# Patient Record
(demographics unavailable — no encounter records)

---

## 2024-10-18 NOTE — HISTORY OF PRESENT ILLNESS
[FreeTextEntry1] : Patient returns today c/o left acoustic neuroma, snoring, sleep disturbance, nasal septal perforation, nasal obstruction, and neck swelling. States that he had an US done and would like to discuss results.  His symptoms are unchanged since his last visit. No new complaints.

## 2024-10-18 NOTE — REASON FOR VISIT
[Subsequent Evaluation] : a subsequent evaluation for [FreeTextEntry2] : left acoustic neuroma, snoring, sleep disturbance, nasal septal perforation, nasal obstruction.

## 2024-10-18 NOTE — PHYSICAL EXAM
[Normal] : mucosa is normal [Midline] : trachea located in midline position [de-identified] : left neck swelling

## 2024-10-18 NOTE — ASSESSMENT
[FreeTextEntry1] : I personally reviewed, interpreted and discussed patient's MRI images. Stable 4mm acoustic neuroma. Will repeat MRI in 1year.   I personally reviewed, interpreted and discussed patient's US images. 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  repeat in 4M.   Continue CPAP use.

## 2024-10-18 NOTE — DATA REVIEWED
[de-identified] : ACC: [id number]     EXAM:  US HEAD NECK SOFT TISSUE   ORDERED BY: KANWAL TOBIN  PROCEDURE DATE:  08/22/2024    INTERPRETATION:  Indication: Left upper neck swelling.  TECHNIQUE: Real-time 2-dimensional grayscale ultrasound with color Doppler images performed in the left posterior neck area, (palpable as per the patient).  COMPARISON: None.  FINDINGS/ IMPRESSION: Well-circumscribed 3.8 x 2.6 cm ovoid solid mass, which appears isoechoic to subcutaneous fat. This may represent a lipoma. Recommend continued follow-up.  --- End of Report ---      LORENA SEALS MD; Attending Radiologist This document has been electronically signed. Aug 23 2024 11:20AM

## 2024-10-21 NOTE — PLAN
[TextEntry] : patient was counseled to use the machine every night at least 4 hrs when he receive the bipap  machine  also counseled for weight loss and exercise i gave him the name and number of the vendor

## 2024-10-21 NOTE — HISTORY OF PRESENT ILLNESS
[TextBox_4] : coming for follow up s/p cpap titration result reviewed and discussed with patient  required bipap machine  bipap was ordered , [employer] the vendor  still complaining of daytime somnolence

## 2025-03-21 NOTE — ASSESSMENT
[FreeTextEntry1] : Patient to repeat IAC MRI in august to f/u on acoustic neuroma.  I personally reviewed, interpreted and discussed patient's US images showing an increasing size probable lipoma.  MRI results reviewed.   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

## 2025-03-21 NOTE — HISTORY OF PRESENT ILLNESS
[FreeTextEntry1] : Patient returns today following up on neck mass, left acoustic neuroma, SNHL, MARTÍN. States that he is having trouble tolerating CPAP machine. Has been having a lot of nasal dryness. Continues to use it but states it is very uncomfortable.  U/S Head & Neck 2/9/25. MRI 3/13/25. Here to discuss results.

## 2025-03-21 NOTE — DATA REVIEWED
[de-identified] :   EXAM:  US HEAD NECK SOFT TISSUE   ORDERED BY: KANWAL TOBIN  *** ADDENDUM # 1 ***  YESENIA Thayer from Dr. Valentin's office was made aware of the above findings on 2/12/2025 at 11:08 AM with read back  --- End of Report ---  *** END OF ADDENDUM # 1 ***   PROCEDURE DATE:  02/09/2025    INTERPRETATION:  Clinical information: Neck mass  Comparison: 8/22/2024  Technique: Focused ultrasound of the left posterior neck.  Findings/ Impression:  Previously described ovoid solid lesion has increased in size since prior examination now measuring 2.4 x 1.0 x 4.4 cm, previously measuring up to 3.8 cm.  Although this lesion demonstrates ultrasound features of a lipoma, due to the interval increase in size, further evaluation with a postcontrast MRI is recommended    --- End of Report --- [de-identified] : MRI  Impressions: Marked patient motion artifact degrading the quality and utility of the exam. There is no evidence of mass lesion, signal abnormality, pathologic enhancement, nor discrete formed/encapsulated lipoma. Bilateral maxillary sinus mucosal thickening, there is right convex deviation of the the visualized posterior aspect fo the nasal septum.

## 2025-03-21 NOTE — REASON FOR VISIT
[Subsequent Evaluation] : a subsequent evaluation for [FreeTextEntry2] : neck mass, left acoustic neuroma, SNHL, MARTÍN

## 2025-04-10 NOTE — PHYSICAL EXAM
[Nasal Endoscopy Performed] : nasal endoscopy was performed, see procedure section for findings [Normal] : mucosa is normal [Midline] : trachea located in midline position [de-identified] : soft lipomatous deposit left posterolateral neck

## 2025-04-10 NOTE — HISTORY OF PRESENT ILLNESS
[Snoring] : snoring [Witnessed Apneas] : witnessed sleep apnea [Frequent Nocturnal Awakening] : frequent nocturnal awakening [Daytime Somnolence] : daytime somnolence [Unintentional Sleep While Inactive] : unintentional sleep while inactive [Awakes Unrefreshed] : awakening unrefreshed [Awakes with Headache] : headache upon awakening [Awakening With Dry Mouth] : awakening with dry mouth [FreeTextEntry1] : Patient returns back for a follow up on subsequent evaluation for neck mass, left acoustic neuroma, SNHL, MARTÍN. Patient reports that he been feeling better since last visit. States there is no pain. He is using a machine to sleep and its not comfortable. Has MRI done wanted to talk about cervical lipoma. Has been using CPAP for more than 3 months and unable to tolerate. Continues to snore. Tired in the morning with occasional headaches. AHI 44.2. BMI 30.1. No history of smoking. Has surgery in the past with reaction to anesthesia. Takes Lisinopril. Not on any blood thinners.   [Unintentional Sleep while Active] : no unintentional sleep while active [Recent  Weight Gain] : no recent weight gain

## 2025-04-10 NOTE — ASSESSMENT
[FreeTextEntry1] : Recommend in-lab PSG to r/o central apnea considering response to BiPAP on titration PSG MR neck and US personally reviewed and interpreted demonstrating lipomatous lesion left posterolateral neck. No signs of complex lipoma concerning for sarcoma Repeat US 6 months

## 2025-04-10 NOTE — REASON FOR VISIT
[Subsequent Evaluation] : a subsequent evaluation for [FreeTextEntry2] :  neck mass, left acoustic neuroma, SNHL, MARTÍN.